# Patient Record
Sex: FEMALE | Race: WHITE | Employment: FULL TIME | URBAN - METROPOLITAN AREA
[De-identification: names, ages, dates, MRNs, and addresses within clinical notes are randomized per-mention and may not be internally consistent; named-entity substitution may affect disease eponyms.]

---

## 2022-10-26 RX ORDER — PREDNISONE 20 MG/1
TABLET ORAL
Qty: 12 TABLET | Refills: 0 | Status: SHIPPED | OUTPATIENT
Start: 2022-10-26

## 2022-10-26 NOTE — PROGRESS NOTES
We are looking into why her Botox was not approved. I did call her in oral steroids to break her headache.

## 2022-11-10 ENCOUNTER — OFFICE VISIT (OUTPATIENT)
Dept: NEUROLOGY | Age: 52
End: 2022-11-10
Payer: COMMERCIAL

## 2022-11-10 VITALS
DIASTOLIC BLOOD PRESSURE: 85 MMHG | WEIGHT: 193 LBS | HEIGHT: 65 IN | HEART RATE: 85 BPM | SYSTOLIC BLOOD PRESSURE: 116 MMHG | BODY MASS INDEX: 32.15 KG/M2

## 2022-11-10 DIAGNOSIS — G43.709 CHRONIC MIGRAINE WITHOUT AURA WITHOUT STATUS MIGRAINOSUS, NOT INTRACTABLE: Primary | ICD-10-CM

## 2022-11-10 PROCEDURE — 64615 CHEMODENERV MUSC MIGRAINE: CPT | Performed by: PSYCHIATRY & NEUROLOGY

## 2022-11-10 ASSESSMENT — ENCOUNTER SYMPTOMS
PHOTOPHOBIA: 1
NAUSEA: 1

## 2022-11-10 NOTE — LETTER
Informed Consent for Botulinumtoxina  (Botox)                                                                         [facility]#_[id number]    Patient Name: Gilma Bailey             :_1970      I give consent for the following procedure(s)    Botulinumtoxina (Botox) injections to be performed by:    [x]   Dr. Clarence Hoffman    []   Dr. Leann Araiza    []   Dr. Mary Johnson    I understand that my provider will have the main responsibility for my care specific to procedure. I understand the the doctor may be fellows, residents or other qualified first assistant's may be involved in my procedure under the supervision of the above provider. My provider has explained to me my condition, the nature and purpose of each procedure, and the risks and types of complications involved, the potential benefits and drawbacks, potential problems related to recovery, the likelihood of success, the possible results of non-treatment,and the reasonable options or alternatives. I have had the chance to ask questions about the planned procedure and all my questions have been answered to my satisfaction. I have received no promises from anyone about the results that may be obtained from the procedure. All procedures involve risk. Routine risks include, but are not limited to, perforation and/or injury to nearby blood vessels, nerve and/or organs, infection, blood clots. Other risks may include paralysis and/or _____________________________________. I understand that the information I have received about the risks may not be fully completed and other less common risks.       ________________________________________Date:_______________  Patient Signature    ________________________________________Date:_______________  Provider Signature                         _______________________________________ Date:_______________             Witness Signature

## 2022-11-10 NOTE — PROGRESS NOTES
[address], [address] [facility]  Phone: [phone] Fax [phone]  Dr. Ayad Johnson        Patient: Glenn Chapman  Provider: Ayad Johnson MD     Procedure note: Botulinum Toxin injections     Indication: Chronic Migraine        Subjective:      Patient presents for chemodenervation for chronic migraine. Referred by Dr. Black Garcia. This will be her third injection. Previous injections have been met with substantial benefit with reduced headache severity and frequency. She has noticed some wearing off within the last couple of weeks. Injections to the masseters were mildly helpful for her jaw clenching but she has asked about increasing the dose there. Headaches have been described as originating in either the left frontal or right frontal head region with associated nausea vomiting and photophobia. She has been on numerous medications for both migraine abortive and prevention therapy. Review of Systems   Constitutional:  Negative for fever. HENT:  Negative for congestion. Eyes:  Positive for photophobia. Cardiovascular:  Negative for chest pain. Gastrointestinal:  Positive for nausea. Genitourinary:  Negative for dysuria. Musculoskeletal:  Negative for gait problem. Skin:  Negative for rash. Allergic/Immunologic: Negative for immunocompromised state. Neurological:  Positive for headaches. Negative for tremors. Psychiatric/Behavioral:  Negative for hallucinations. Past medical history, surgical history, social history, family history, medications and allergies were all reviewed and updated as appropriate.       Outpatient Encounter Medications as of 11/10/2022   Medication Sig Dispense Refill    predniSONE (DELTASONE) 20 MG tablet Take 3 po q day for 2 days than 2 po q day for 2 days than 1 p q day for 2 ays. 12 tablet 0    Rimegepant Sulfate (NURTEC) 75 MG TBDP Take one at onset of headache do no repeat for 24 hrs 12 tablet 5    OnabotulinumtoxinA, Cosmetic, (BOTOX COSMETIC) 100 units SOLR Inject 200 Units into the muscle every 3 months 2 each 3    UBRELVY 100 MG TABS TAKE 1 TABLET BY MOUTH ONCE AS NEEDED FOR MIGRAINE 9 tablet 9    amLODIPine (NORVASC) 10 MG tablet Take by mouth daily      butalbital-acetaminophen-caffeine (FIORICET, ESGIC) -40 MG per tablet Take 1 tablet by mouth      esomeprazole (NEXIUM) 20 MG delayed release capsule Take by mouth daily      Fremanezumab-vfrm (AJOVY) 225 MG/1.5ML SOAJ Inject 225 mg into the skin      losartan (COZAAR) 25 MG tablet Take 25 mg by mouth daily      magnesium oxide (MAG-OX) 400 (240 Mg) MG tablet Take 400 mg by mouth 2 times daily      meclizine (ANTIVERT) 12.5 MG tablet Take by mouth 3 times daily as needed      methylPREDNISolone (MEDROL) 4 MG tablet Take by mouth daily (with breakfast)      Omega-3 Fatty Acids (FISH OIL) 1000 MG CAPS Take 2 g by mouth 2 times daily      ondansetron (ZOFRAN) 4 MG tablet Take 4 mg by mouth every 8 hours as needed      potassium chloride (KLOR-CON M20) 20 MEQ extended release tablet TAKE 1 TABLET BY MOUTH TWICE A DAY FOR 10 DAYS      predniSONE (DELTASONE) 20 MG tablet Take 3 po q day for 2 days than 2 po q day for 2 days than 1 po q day for 2 days. tiZANidine (ZANAFLEX) 4 MG tablet Take 4 mg by mouth 3 times daily as needed      topiramate (TOPAMAX) 50 MG tablet Take by mouth 2 times daily      traMADol (ULTRAM) 50 MG tablet Take 50 mg by mouth every 6 hours as needed. triamcinolone (KENALOG) 0.1 % cream Apply topically 2 times daily      ZOLMitriptan (ZOMIG) 5 MG tablet Take by mouth as needed      [] onabotulinumtoxin A (BOTOX) injection 200 Units        No facility-administered encounter medications on file as of 11/10/2022. Exam:      Visit Vitals  Vitals:    11/10/22 1154   BP: 116/85   Pulse: 85     General Exam:  General - Well developed, well nourished, in no apparent distress.    HENT - Normocephalic, atraumatic. Oropharynx clear. Eyes - Sclera anicteric. External Ears - Appear normal.   Neck - No visualized mass. Lungs - Breathing is non-labored. Skin - No significant lesions or discoloration noted. Musculoskeletal - Moves all extremities. No deformities noted. Psychiatric - Normal affect. No hallucinations. Neurological Exam:      MS/Language/Speech - Alert. Attentive and cooperative. Language fluent. Speech is clear. Cranial Nerves - Eye movements full. No nystagmus. No facial asymmetry. Tongue was midline. Shoulder shrug symmetric. Motor - Moves all extremities well with no apparent limitations. No tremor present. Cerebellar - No ataxia or dysmetria. Gait - Unremarkable. Assessment/Plan:      Pj Mckeon is a 45 y. o.female who presents for chemodenervation for chronic migraine. Moose Zhang was seen today for follow-up. Diagnoses and all orders for this visit:    Chronic migraine without aura without status migrainosus, not intractable  -     onabotulinumtoxin A (BOTOX) injection 200 Units  -     [address]802 - Chemodenervation of muscle(s): innervated by facial, trigeminal, cervical spinal and accessory nerves, bilateral    Proceed with botulinum toxin injections as noted below. RTC 3 months for review and re-injection. Procedure:       Consent: Written consent obtained after the potential risks and benefits have been explained to the patient. Potential risks include:  Pain, bruising, bleeding, infection, flu-like symptoms, over-weakening of injected or adjacent muscles and swallowing dysfunction. Patient was given the botulinum toxin medication guide according to FDA standards. Technique: Botox A 200 unit was dissolved into non-preservative normal saline 4 ml. Gauge 30 facial needles were used for the injection. Procedure: Five units/ 0.1 ml per site unless specified. Occipitalis R3 L3. Cervical paraspinal R2 L2. Trapezius R3 L3.  Temporalis R4+1 L4+1. Frontalis R2 L2. Procerus 1. Corrugators R1 L1. Masseters R1 (7.5 units) L1 (7.5 units)       Total injected: 180 units BOTOX 100units/2 cc. Waste: 20 units     Comments: There were no complications. The patient tolerated the procedure well. Signature: Bee Beverly MD  Date: 11/10/22  [facility]   [place] [address], [address]  Ph: [phone]  Fax: [phone]     I have personally interviewed and examined Mrs. Tori Espitia and I have personally reviewed all relevant records including labs and imaging as noted above. I have written all aspects of this note. More than 50% of this time was used for counseling regarding my diagnosis, prognosis, and plans for management.    Procedure Time: 10 minutes  Total visit time (including procedures): N/A

## 2023-02-15 ENCOUNTER — TELEPHONE (OUTPATIENT)
Dept: NEUROLOGY | Age: 53
End: 2023-02-15

## 2023-02-16 ENCOUNTER — OFFICE VISIT (OUTPATIENT)
Dept: NEUROLOGY | Age: 53
End: 2023-02-16

## 2023-02-16 VITALS
DIASTOLIC BLOOD PRESSURE: 88 MMHG | SYSTOLIC BLOOD PRESSURE: 132 MMHG | HEART RATE: 68 BPM | BODY MASS INDEX: 32.15 KG/M2 | WEIGHT: 193 LBS | OXYGEN SATURATION: 99 % | HEIGHT: 65 IN

## 2023-02-16 DIAGNOSIS — G43.709 CHRONIC MIGRAINE WITHOUT AURA WITHOUT STATUS MIGRAINOSUS, NOT INTRACTABLE: Primary | ICD-10-CM

## 2023-02-16 ASSESSMENT — ENCOUNTER SYMPTOMS
NAUSEA: 1
PHOTOPHOBIA: 1

## 2023-02-16 NOTE — LETTER
Informed Consent for Botulinumtoxina  (Botox)                                                                  [facility]#_[id number]    Patient Name: Anu Darby             :_1970    I give consent for the following procedure(s)  Botulinumtoxina (Botox) injections to be performed by:    [x]   Dr. Ethel Pang    []   Salvador Castro NP    []   Dr. Ava Moore    []   Dr. Bonita Jerome    I understand that my provider will have the main responsibility for my care specific to procedure. I understand the the doctor may be fellows, residents or other qualified first assistant's may be involved in my procedure under the supervision of the above provider. My provider has explained to me my condition, the nature and purpose of each procedure, and the risks and types of complications involved, the potential benefits and drawbacks, potential problems related to recovery, the likelihood of success, the possible results of non-treatment,and the reasonable options or alternatives. I have had the chance to ask questions about the planned procedure and all my questions have been answered to my satisfaction. I have received no promises from anyone about the results that may be obtained from the procedure. All procedures involve risk. Routine risks include, but are not limited to, perforation and/or injury to nearby blood vessels, nerve and/or organs, infection, blood clots. Other risks may include paralysis and/or _____________________________________. I understand that the information I have received about the risks may not be fully completed and other less common risks.     ________________________________________Date:_______________  Patient Signature    ________________________________________Date:_______________  Provider Signature                         _______________________________________ Date:_______________             Witness Signature

## 2023-02-16 NOTE — PROGRESS NOTES
[address] [facility], [address] [place] [address], [address] [place] [address]  Phone: [phone] Fax [phone]  Dr. Katiana Ahmadi        Patient: Fox Granados  Provider: Katiana Ahmadi MD     Procedure note: Botulinum Toxin injections     Indication: Chronic Migraine        Subjective:      Patient presents for chemodenervation for chronic migraine. Referred by Dr. Mateo Orr. This will be her third injection. Previous injections have been met with substantial benefit with reduced headache severity and frequency. She has noticed some wearing off within the last couple of weeks. Injections to the masseters have been beneficial for jaw clenching/TMJ pain. Headaches have been described as originating in either the left frontal or right frontal head region with associated nausea vomiting and photophobia. She has been on numerous medications for both migraine abortive and prevention therapy. Continues to take Ubrelvy or Nurtec as needed for breakthrough headaches. Review of Systems   Constitutional:  Negative for fever. HENT:  Negative for congestion. Eyes:  Positive for photophobia. Cardiovascular:  Negative for chest pain. Gastrointestinal:  Positive for nausea. Genitourinary:  Negative for dysuria. Musculoskeletal:  Negative for gait problem. Skin:  Negative for rash. Allergic/Immunologic: Negative for immunocompromised state. Neurological:  Positive for headaches. Negative for tremors. Psychiatric/Behavioral:  Negative for hallucinations. Past medical history, surgical history, social history, family history, medications and allergies were all reviewed and updated as appropriate.       Outpatient Encounter Medications as of 2/16/2023   Medication Sig Dispense Refill    predniSONE (DELTASONE) 20 MG tablet Take 3 po q day for 2 days than 2 po q day for 2 days than 1 p q day for 2 ays. 12 tablet 0    Rimegepant Sulfate (NURTEC) 75 MG TBDP Take one at onset of headache do no repeat for 24 hrs 12 tablet 5    OnabotulinumtoxinA, Cosmetic, (BOTOX COSMETIC) 100 units SOLR Inject 200 Units into the muscle every 3 months 2 each 3    UBRELVY 100 MG TABS TAKE 1 TABLET BY MOUTH ONCE AS NEEDED FOR MIGRAINE 9 tablet 9    amLODIPine (NORVASC) 10 MG tablet Take by mouth daily      butalbital-acetaminophen-caffeine (FIORICET, ESGIC) -40 MG per tablet Take 1 tablet by mouth      esomeprazole (NEXIUM) 20 MG delayed release capsule Take by mouth daily      Fremanezumab-vfrm (AJOVY) 225 MG/1.5ML SOAJ Inject 225 mg into the skin      losartan (COZAAR) 25 MG tablet Take 25 mg by mouth daily      magnesium oxide (MAG-OX) 400 (240 Mg) MG tablet Take 400 mg by mouth 2 times daily      meclizine (ANTIVERT) 12.5 MG tablet Take by mouth 3 times daily as needed      methylPREDNISolone (MEDROL) 4 MG tablet Take by mouth daily (with breakfast)      Omega-3 Fatty Acids (FISH OIL) 1000 MG CAPS Take 2 g by mouth 2 times daily      ondansetron (ZOFRAN) 4 MG tablet Take 4 mg by mouth every 8 hours as needed      potassium chloride (KLOR-CON M20) 20 MEQ extended release tablet TAKE 1 TABLET BY MOUTH TWICE A DAY FOR 10 DAYS      predniSONE (DELTASONE) 20 MG tablet Take 3 po q day for 2 days than 2 po q day for 2 days than 1 po q day for 2 days. tiZANidine (ZANAFLEX) 4 MG tablet Take 4 mg by mouth 3 times daily as needed      traMADol (ULTRAM) 50 MG tablet Take 50 mg by mouth every 6 hours as needed. triamcinolone (KENALOG) 0.1 % cream Apply topically 2 times daily      ZOLMitriptan (ZOMIG) 5 MG tablet Take by mouth as needed      [] Onabotulinumtoxin A (BOTOX) injection 200 Units        No facility-administered encounter medications on file as of 2023. Exam:      Visit Vitals  Vitals:    23 1337   BP: 132/88   Pulse: 68   SpO2: 99%     General Exam:  General - Well developed, well nourished, in no apparent distress. HENT - Normocephalic, atraumatic.  Oropharynx clear.   Eyes - Sclera anicteric. External Ears - Appear normal.   Neck - No visualized mass. Lungs - Breathing is non-labored. Skin - No significant lesions or discoloration noted. Musculoskeletal - Moves all extremities. No deformities noted. Psychiatric - Normal affect. No hallucinations. Neurological Exam:      MS/Language/Speech - Alert. Attentive and cooperative. Language fluent. Speech is clear. Cranial Nerves - Eye movements full. No nystagmus. No facial asymmetry. Tongue was midline. Shoulder shrug symmetric. Motor - Moves all extremities well with no apparent limitations. No tremor present. Cerebellar - No ataxia or dysmetria. Gait - Unremarkable. Assessment/Plan:      Yimi Degroot is a 52 y. o.female who presents for chemodenervation for chronic migraine. Tomeka Krishna was seen today for follow-up. Diagnoses and all orders for this visit:    Chronic migraine without aura without status migrainosus, not intractable  -     Onabotulinumtoxin A (BOTOX) injection 200 Units  -     80573 - Chemodenervation of muscle(s): innervated by facial, trigeminal, cervical spinal and accessory nerves, bilateral      Proceed with botulinum toxin injections as noted below. RTC 3 months for review and re-injection. Procedure:       Consent: Written consent obtained after the potential risks and benefits have been explained to the patient. Potential risks include:  Pain, bruising, bleeding, infection, flu-like symptoms, over-weakening of injected or adjacent muscles and swallowing dysfunction. Patient was given the botulinum toxin medication guide according to FDA standards. Technique: Botox A 200 unit was dissolved into non-preservative normal saline 4 ml. Gauge 30 facial needles were used for the injection. Procedure: Five units/ 0.1 ml per site unless specified. Occipitalis R3 L3. Cervical paraspinal R2 L2. Trapezius R3 L3. Temporalis R4+1 L4+1.  Frontalis R2 L2. Procerus 1. Corrugators R1 L1. Masseters R1 (7.5 units) L1 (7.5 units)       Total injected: 180 units BOTOX 100units/2 cc. Waste: 20 units     Comments: There were no complications. The patient tolerated the procedure well. Signature: Enmanuel Barrera MD  Date: 2/16/23  [facility]   [address]  Ph: [phone]  Fax: [phone]     I have personally interviewed and examined Mrs. Dede Camejo and I have personally reviewed all relevant records including labs and imaging as noted above. I have written all aspects of this note. More than 50% of this time was used for counseling regarding my diagnosis, prognosis, and plans for management.    Procedure Time: 10 minutes  Total visit time (including procedures): N/A

## 2023-03-27 ENCOUNTER — CLINICAL DOCUMENTATION (OUTPATIENT)
Dept: NEUROLOGY | Age: 53
End: 2023-03-27

## 2023-03-27 RX ORDER — PREDNISONE 20 MG/1
TABLET ORAL
Qty: 12 TABLET | Refills: 0 | Status: SHIPPED | OUTPATIENT
Start: 2023-03-27

## 2023-05-18 ENCOUNTER — OFFICE VISIT (OUTPATIENT)
Dept: NEUROLOGY | Age: 53
End: 2023-05-18

## 2023-05-18 VITALS — OXYGEN SATURATION: 98 % | DIASTOLIC BLOOD PRESSURE: 87 MMHG | SYSTOLIC BLOOD PRESSURE: 132 MMHG | HEART RATE: 86 BPM

## 2023-05-18 DIAGNOSIS — G43.709 CHRONIC MIGRAINE WITHOUT AURA WITHOUT STATUS MIGRAINOSUS, NOT INTRACTABLE: Primary | ICD-10-CM

## 2023-05-18 ASSESSMENT — ENCOUNTER SYMPTOMS
NAUSEA: 1
PHOTOPHOBIA: 1

## 2023-05-18 NOTE — PROGRESS NOTES
[address] [facility], [address] [place] [address], [address] [place] [address]  Phone: [phone] Fax [phone]  Dr. Malik Huggins        Patient: Pradeep Hernández  Provider: Malik Huggins MD     Procedure note: Botulinum Toxin injections     Indication: Chronic Migraine        Subjective:      Patient presents for chemodenervation for chronic migraine. Referred by Dr. Ev Toribio. She has had several injections over the last year. Previous injections have been met with substantial benefit with reduced headache severity and frequency. She has noticed some wearing off within the last couple of weeks. Injections to the masseters have been beneficial for jaw clenching/TMJ pain. Headaches have been described as originating in either the left frontal or right frontal head region with associated nausea vomiting and photophobia. She has been on numerous medications for both migraine abortive and prevention therapy. Continues to take Ubrelvy or Nurtec as needed for breakthrough headaches. Review of Systems   Constitutional:  Negative for fever. HENT:  Negative for congestion. Eyes:  Positive for photophobia. Cardiovascular:  Negative for chest pain. Gastrointestinal:  Positive for nausea. Genitourinary:  Negative for dysuria. Musculoskeletal:  Negative for gait problem. Skin:  Negative for rash. Allergic/Immunologic: Negative for immunocompromised state. Neurological:  Positive for headaches. Negative for tremors. Psychiatric/Behavioral:  Negative for hallucinations. Past medical history, surgical history, social history, family history, medications and allergies were all reviewed and updated as appropriate.       Outpatient Encounter Medications as of 5/18/2023   Medication Sig Dispense Refill    predniSONE (DELTASONE) 20 MG tablet Take 3 po q day for 2 days than 2 po q day for 2 days than 1 po q day for 2 days than stop 12 tablet 0    predniSONE (DELTASONE) 20 MG tablet